# Patient Record
Sex: MALE | Race: WHITE | Employment: PART TIME | URBAN - METROPOLITAN AREA
[De-identification: names, ages, dates, MRNs, and addresses within clinical notes are randomized per-mention and may not be internally consistent; named-entity substitution may affect disease eponyms.]

---

## 2024-03-20 ENCOUNTER — PROCEDURE VISIT (OUTPATIENT)
Dept: AUDIOLOGY | Age: 44
End: 2024-03-20
Payer: COMMERCIAL

## 2024-03-20 ENCOUNTER — OFFICE VISIT (OUTPATIENT)
Dept: ENT CLINIC | Age: 44
End: 2024-03-20
Payer: COMMERCIAL

## 2024-03-20 VITALS
DIASTOLIC BLOOD PRESSURE: 95 MMHG | TEMPERATURE: 97.7 F | OXYGEN SATURATION: 97 % | WEIGHT: 221.8 LBS | HEART RATE: 76 BPM | SYSTOLIC BLOOD PRESSURE: 136 MMHG

## 2024-03-20 DIAGNOSIS — H93.13 TINNITUS OF BOTH EARS: ICD-10-CM

## 2024-03-20 DIAGNOSIS — H90.3 BILATERAL HIGH FREQUENCY SENSORINEURAL HEARING LOSS: ICD-10-CM

## 2024-03-20 DIAGNOSIS — H93.13 SUBJECTIVE TINNITUS OF BOTH EARS: Primary | Chronic | ICD-10-CM

## 2024-03-20 DIAGNOSIS — H93.8X2 SENSATION OF FULLNESS IN LEFT EAR: ICD-10-CM

## 2024-03-20 DIAGNOSIS — H90.3 SENSORINEURAL HEARING LOSS, BILATERAL: Primary | ICD-10-CM

## 2024-03-20 PROCEDURE — 92557 COMPREHENSIVE HEARING TEST: CPT

## 2024-03-20 PROCEDURE — 92567 TYMPANOMETRY: CPT

## 2024-03-20 PROCEDURE — 99203 OFFICE O/P NEW LOW 30 MIN: CPT | Performed by: OTOLARYNGOLOGY

## 2024-03-20 NOTE — PROGRESS NOTES
available \"over the counter\" (eg. Bausch and Lomb or Murine, or L[employer]aro Med) if you feel a need to try to remove ear wax.  No other methods should be self used for this purpose as there is danger of injury to the ear and risk of irreparable and irreversible permanent hearing loss.         Luis Felipe Jackson MD    [facility]  Department of Otolaryngology/Head and Neck Surgery  [facility]  [address], [address]  [place] [address]  [phone]

## 2024-03-20 NOTE — PATIENT INSTRUCTIONS
You should consider amplification therapy, hearing aid, if you are having difficulty communicating and/or hearing important sounds.  You may follow up with the [facility] audiologist or with another hearing aid provider of your choice.  You should return for an annual hearing test to monitor your hearing, and whenever your hearing further decreases significantly.    You should return if your ears plug up with ear wax causing difficulty hearing or pressure.    You should employ the tinnitus masking techniques and strategies we discussed, as needed.  Bedside tinnitus masking devices (eg. a white noise machine, noise machine, noise tevin on your cell phone, fan on in the room, radio with light music or tuned between stations to white noise static) can be very helpful.  You should avoid exposure to excessively high levels of noise/sound and use hearing protection measures we discussed, as needed, if such exposure is unavoidable.  I recommend that you use Lipoflavonoid Plus, (use brand name, not generic, for the first six months), for treatment of your tinnitus.  This is available \"over the counter\" at your pharmacy.  You should take two orally three times a day for the first 60 days, then one orally three times a day thereafter.  Take this medication continuously for six months.  If you have seen no improvement in your tinnitus after a full six months of use, you should consider cessation of this treatment.   NO Q-TIPS IN THE EARS  You should never clean your ears with a Q-tip, cotton tipped applicator, Inna pin, paper clip, or any other instrument.  I recommend only use of one the several ear wax removal kits available \"over the counter\" (eg. Bausch and Lomb or Murine, or L[employer]aro Med) if you feel a need to try to remove ear wax.  No other methods should be self used for this purpose as there is danger of injury to the ear and risk of irreparable and irreversible permanent hearing loss.

## 2024-03-20 NOTE — PROGRESS NOTES
Dash Yu   1980, 43 y.o. male   [id number]       Referring Provider: Luis Felipe Jackson MD  Referral Type: In an order in Epic    Reason for Visit: Evaluation of suspected change in hearing, tinnitus, or balance.    ADULT AUDIOLOGIC EVALUATION      aDsh Yu is a 43 y.o. male seen today, 3/20/2024 , for an initial audiologic evaluation.  Patient was seen by Luis Felipe Jackson MD following today's evaluation.    AUDIOLOGIC AND OTHER PERTINENT MEDICAL HISTORY:      Dash Yu reports ringing tinnitus bilaterally that has been ongoing for a year and a half. Patient perceives muffled hearing and popping in the left ear. Tinnitus has also gradually gotten worse recently especially in the left ear. Admits to occupational noise exposure and listens to loud music; no hearing protection worn.    He denied otalgia, otorrhea, dizziness, history of head trauma, history of ear surgery, and family history of hearing loss    Date: 3/20/2024     IMPRESSIONS:      Today's results revealed symmetric sensorineural hearing loss, bilaterally. Excellent speech understanding when in quiet, bilaterally. Tympanometry indicates low movement of ear drum/tympanic membrane, consistent with middle ear abnormality in the left, normal function in the right. Discussed test results and implications with patient. Discussed possible benefits of amplification.  Discussed use of tinnitus management strategies. Discussed noise exposure and the importance of appropriate hearing protection when in hazardous noise environments.  Follow medical recommendations of Luis Felipe Jackson MD.     ASSESSMENT AND FINDINGS:     Otoscopy unremarkable.    RIGHT EAR:  Hearing Sensitivity: Normal sloping to moderate sensorineural hearing loss   Speech Recognition Threshold: 10 dB HL  Word Recognition: Excellent 100%, based on NU-6 25-word list at 50 dBHL using recorded speech stimuli.    Tympanometry: Normal peak pressure and compliance, Type A tympanogram,